# Patient Record
Sex: FEMALE | Race: WHITE | NOT HISPANIC OR LATINO | Employment: PART TIME | URBAN - METROPOLITAN AREA
[De-identification: names, ages, dates, MRNs, and addresses within clinical notes are randomized per-mention and may not be internally consistent; named-entity substitution may affect disease eponyms.]

---

## 2022-12-03 ENCOUNTER — OFFICE VISIT (OUTPATIENT)
Dept: FAMILY MEDICINE | Facility: CLINIC | Age: 67
End: 2022-12-03
Payer: OTHER MISCELLANEOUS

## 2022-12-03 VITALS
RESPIRATION RATE: 18 BRPM | DIASTOLIC BLOOD PRESSURE: 83 MMHG | WEIGHT: 185 LBS | OXYGEN SATURATION: 98 % | HEART RATE: 81 BPM | TEMPERATURE: 98 F | SYSTOLIC BLOOD PRESSURE: 125 MMHG

## 2022-12-03 DIAGNOSIS — M62.81 QUADRICEPS WEAKNESS: Primary | ICD-10-CM

## 2022-12-03 PROCEDURE — 99203 OFFICE O/P NEW LOW 30 MIN: CPT | Performed by: STUDENT IN AN ORGANIZED HEALTH CARE EDUCATION/TRAINING PROGRAM

## 2022-12-03 NOTE — LETTER
December 3, 2022       TO: Luh Hooks  [address] [place] [id number]         To whom it may concern,    Luh Hooks was seen on 12/3/2022, her knee concern was evaluated and determined to likely be quadriceps insufficiency.  At this could be related to overuse at work, do not think that should prevent her from working.  Exercises were given and physical therapy was prescribed.      Sincerely,      Tila Bryan MD

## 2022-12-03 NOTE — PROGRESS NOTES
ASSESSMENT/PLAN:  (M62.81) Quadriceps weakness  (primary encounter diagnosis)  Comment: Suspect patient has weakness of quadriceps muscles contributing to sensation of knee buckling, also wonder if she has some IT band tightness.  Based on exam do not think there is a ligament or meniscal tear, no concern for fracture.  Low concern for arthritis given lack of pain with movement or on exam.  Plan: Physical Therapy Referral, provided exercises to strengthen quadriceps muscles and letter for work      Appropriate PPE worn.    Options for treatment and follow-up care were reviewed with the patient and/or guardian. Luh Hooks and/or guardian engaged in the decision making process and verbalized understanding of the options discussed and agreed with the final plan.    See [facility] for orders, medications, letters, patient instructions  This note was dictated with Rich luna.    Tila Bryan MD    SUBJECTIVE:  Luh Hooks is an 67 year old female who presents for right knee buckling.  - Works for [employer] and is on her feet a lot, during a shift her leg buckled a little and then it happened again so she mentioned it to her supervisor and she was feeling better, however when leaving it occurred again   - Right lateral knee is where she feels the weakness or feeling of her leg giving out   - Has been icing it and taking Tylenol, even though it is not painful   - Has continued to have the buckling sensation throughout the weekend   - Has been working extra shifts and longer shifts than she usually does  - Denies any weakness in her foot or foot drop  - Denies any back pain or injury to knee    PMH:   has no past medical history on file.  There is no problem list on file for this patient.    Social History     Socioeconomic History     Marital status:      Spouse name: None     Number of children: None     Years of education: None     Highest education level: None   Tobacco Use     Smoking status:  Never     Smokeless tobacco: Never     No family history on file.    ALLERGIES:  Patient has no known allergies.    No current outpatient medications on file.     No current facility-administered medications for this visit.         ROS:  ROS is done and is negative for general/constitutional, eye, ENT, Respiratory, cardiovascular, GI, , Skin, musculoskeletal except as noted elsewhere.  All other review of systems negative except as noted elsewhere.    OBJECTIVE:  /83   Pulse 81   Temp 98  F (36.7  C) (Tympanic)   Resp 18   Wt 83.9 kg (185 lb)   SpO2 98%   General: Sitting comfortably. No acute distress.   HEENT: Conjunctivae are clear without discharge or erythema.   Neck: No masses. No obvious adenopathy.  Respiratory: No respiratory distress. Lung sounds are clear without rales, ronchi, or wheezes.   Cardiac: Warm and well perfused. Brisk cap refill.  Abdominal: Abdomen is soft and non-tender.  Extremities: Upper and lower extremities grossly normal.  Right knee without tenderness to palpation along the joint line, no pain with special tests making ligament tear is unlikely  Skin: Skin warm without rashes.  Neurological: Motor function is grossly normal.   Psychiatric: Good insight and judgement.      RESULTS  No results found for any visits on 12/03/22.  No results found for this or any previous visit (from the past 48 hour(s)).

## 2022-12-09 ENCOUNTER — HOSPITAL ENCOUNTER (OUTPATIENT)
Dept: PHYSICAL THERAPY | Facility: REHABILITATION | Age: 67
Discharge: HOME | End: 2022-12-09
Attending: STUDENT IN AN ORGANIZED HEALTH CARE EDUCATION/TRAINING PROGRAM
Payer: OTHER MISCELLANEOUS

## 2022-12-09 DIAGNOSIS — M62.81 QUADRICEPS WEAKNESS: ICD-10-CM

## 2022-12-09 PROCEDURE — 97161 PT EVAL LOW COMPLEX 20 MIN: CPT | Mod: GP | Performed by: PHYSICAL THERAPIST

## 2022-12-09 PROCEDURE — 97110 THERAPEUTIC EXERCISES: CPT | Mod: GP | Performed by: PHYSICAL THERAPIST

## 2022-12-09 NOTE — PROGRESS NOTES
12/09/22 1330   General Information   Type of Visit Initial OP Ortho PT Evaluation   Start of Care Date 12/09/22   Referring Physician Tila Bryan MD   Patient/Family Goals Statement decrease leg pain/weakness   Orders Evaluate and Treat   Date of Order 12/03/22   Certification Required? No   Medical Diagnosis M62.81 (ICD-10-CM) - Quadriceps weakness   Presentation and Etiology   Pertinent history of current problem (include personal factors and/or comorbidities that impact the POC) Patient is a 67 year old who experienced knee buckling on 11/30/2022 happened 3-4 times each day but it has decreased in frequency did happen yesterday.  This morning at work it didn't happen at all feels she is moving faster and more confidently without knee buckling. Feels it is getting better. Was interfering with her confidence increased fear of falling, impaired ambulation decreased speed, and completing stairs slower.  Job requires a lot of standing/turning more consciously trying not to twist as much.   Impairments A. Pain;F. Decreased strength and endurance;G. Impaired balance;H. Impaired gait   Functional Limitations perform required work activities;perform desired leisure / sports activities   Symptom Location lateral knee/posterior   How/Where did it occur At work   Onset date of current episode/exacerbation 11/30/22   Chronicity New   Pain rating (0-10 point scale) Best (/10);Worst (/10);Other   Best (/10) 0/10   Worst (/10) 4/10   Pain quality H. Other;G. Cramping  (hard to describe due to pain being very short duration)   Frequency of pain/symptoms B. Intermittent   Pain/symptoms are: Worse during the day   Pain/symptoms exacerbated by B. Walking;L. Work tasks;M. Other  (standing)   Pain/symptoms eased by C. Rest;E. 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 No   Have you fallen and had an injury in the past year? No   Is patient a fall risk? No   Abuse Screen (yes response referral indicated)   Feels Unsafe at Home or Work/School no   Feels Threatened by Someone no   Does Anyone Try to Keep You From Having Contact with Others or Doing Things Outside Your Home? no   Physical Signs of Abuse Present no   Knee Objective Findings   Knee/Hip Strength Comments glut med/max/hamstring 2+/5 R and 3+/5 L, seated strength screen 4/5 all motions hip flexion, add, knee ext, knee flex, DF and great toe ext (abd notably weak see formal test)   Knee Flexibility Comments decreased quadriceps length, decreased piriformis length R greater than L, ITB tightness R greater than L, decreased hamstring length B   Accessory Motion/Joint Mobility lumbar ROM WFL repeated flex decreased extensibility of hamstrings, ext repeated WFL no increased sx, Hip ROM WFL slight decreased tissue extensibility R hip as compared to L   Planned Therapy Interventions   Planned Therapy Interventions joint mobilization;manual therapy;neuromuscular re-education;ROM;strengthening;stretching;transfer training   Clinical Impression   Criteria for Skilled Therapeutic Interventions Met yes, treatment indicated   PT Diagnosis R knee pain   Influenced by the following impairments decresed strength, impaired flexibility,   Functional limitations due to impairments ambulation, stairs, work tasks, household tasks   Clinical Presentation Stable/Uncomplicated   Clinical Presentation Rationale presents as expected based on chart review   Clinical Decision Making (Complexity) Low complexity   Therapy Frequency 1 time/week    Predicted Duration of Therapy Intervention (days/wks) 4 weeks-6 weeks   Risk & Benefits of therapy have been explained Yes   Patient, Family & other staff in agreement with plan of care Yes   Clinical Impression Comments Patient presents with muscle imbalance at hip and knee causing increased strain buckling is likely due to hamstring or ITB will require skilled PT to restore muscle balance for adequate R knee support and return to PLOF and ability to complete work tasks.   Ortho Goal 1   Goal Identifier work   Goal Description Patient will be able to complete full work week without L knee spasm/buckling for return to prior level of ability.   Goal Progress initiated   Target Date 02/03/23   Ortho Goal 2   Goal Identifier walking   Goal Description Patient will be able to ambulate including stairs without sx greater than 3/10 or sensation of unsteadiness on all types of services including stairs.   Goal Progress initiated   Target Date 02/03/23   Ortho Goal 3   Goal Identifier HEP   Goal Description Patient will be able to demonstrate 6 exercises without need for assist for progression to self management.   Goal Progress intiated   Target Date 02/03/23   Total Evaluation Time   PT Eval, Low Complexity Minutes (85083) 20   Kali Mata, PT

## 2022-12-29 ENCOUNTER — HOSPITAL ENCOUNTER (OUTPATIENT)
Dept: PHYSICAL THERAPY | Facility: REHABILITATION | Age: 67
Discharge: HOME | End: 2022-12-29
Attending: STUDENT IN AN ORGANIZED HEALTH CARE EDUCATION/TRAINING PROGRAM
Payer: OTHER MISCELLANEOUS

## 2022-12-29 DIAGNOSIS — M62.81 QUADRICEPS WEAKNESS: Primary | ICD-10-CM

## 2022-12-29 PROCEDURE — 97110 THERAPEUTIC EXERCISES: CPT | Mod: GP | Performed by: PHYSICAL THERAPIST

## 2023-01-27 NOTE — PROGRESS NOTES
[facility]    Outpatient Physical Therapy Discharge Note  Patient: Luh Hooks  : 1955    Beginning/End Dates of Reporting Period:  2022 to 2022    Referring Provider: Tila Bryan MD    Therapy Diagnosis: quadriceps weakness     Client Self Report: Patient reports knee giving way decreased frequency has happened 1 x in past 2 weeks. Exercises are going well although she hasn't been doing them as consistently due to the holidays.        Goals:  Goal Identifier work   Goal Description Patient will be able to complete full work week without L knee spasm/buckling for return to prior level of ability.   Target Date 23   Date Met      Progress (detail required for progress note): in progress, decreased frequency (went 1 week without), did happen earlier this week     Goal Identifier walking   Goal Description Patient will be able to ambulate including stairs without sx greater than 3/10 or sensation of unsteadiness on all types of services including stairs.   Target Date 23   Date Met      Progress (detail required for progress note): normal now, no issues on stairs anymore, MET     Goal Identifier HEP   Goal Description Patient will be able to demonstrate 6 exercises without need for assist for progression to self management.   Target Date 23   Date Met      Progress (detail required for progress note): Patient reports exercises going well, hasn't done them as routinely because of holidays.     Goal Identifier     Goal Description     Target Date     Date Met      Progress (detail required for progress note):       Goal Identifier     Goal Description     Target Date     Date Met      Progress (detail required for progress note):       Goal Identifier     Goal Description     Target Date     Date Met      Progress (detail required for progress note):       Goal  Identifier     Goal Description     Target Date     Date Met      Progress (detail required for progress note):       Goal Identifier     Goal Description     Target Date     Date Met      Progress (detail required for progress note):             Plan:  Discharge from therapy.    Discharge:    Reason for Discharge: Patient has failed to schedule further appointments.        Discharge Plan: Patient to continue home program.

## 2023-01-27 NOTE — ADDENDUM NOTE
Encounter addended by: Enriqueta Mata, PT on: 1/27/2023 2:54 PM   Actions taken: Episode resolved, Pend clinical note, Flowsheet accepted, Clinical Note Signed

## 2023-02-12 ENCOUNTER — HEALTH MAINTENANCE LETTER (OUTPATIENT)
Age: 68
End: 2023-02-12

## 2024-03-10 ENCOUNTER — HEALTH MAINTENANCE LETTER (OUTPATIENT)
Age: 69
End: 2024-03-10

## 2025-02-16 ENCOUNTER — HEALTH MAINTENANCE LETTER (OUTPATIENT)
Age: 70
End: 2025-02-16

## 2025-03-16 ENCOUNTER — HEALTH MAINTENANCE LETTER (OUTPATIENT)
Age: 70
End: 2025-03-16